# Patient Record
Sex: MALE | Race: WHITE | NOT HISPANIC OR LATINO | Employment: OTHER | URBAN - NONMETROPOLITAN AREA
[De-identification: names, ages, dates, MRNs, and addresses within clinical notes are randomized per-mention and may not be internally consistent; named-entity substitution may affect disease eponyms.]

---

## 2019-04-01 ENCOUNTER — TRANSFERRED RECORDS (OUTPATIENT)
Dept: HEALTH INFORMATION MANAGEMENT | Facility: OTHER | Age: 74
End: 2019-04-01

## 2019-05-22 ENCOUNTER — TRANSFERRED RECORDS (OUTPATIENT)
Dept: HEALTH INFORMATION MANAGEMENT | Facility: OTHER | Age: 74
End: 2019-05-22

## 2019-11-01 RX ORDER — QUINAPRIL 10 MG/1
10 TABLET ORAL AT BEDTIME
COMMUNITY

## 2019-11-01 RX ORDER — MONTELUKAST SODIUM 10 MG/1
10 TABLET ORAL AT BEDTIME
COMMUNITY

## 2019-11-01 RX ORDER — OXYCODONE AND ACETAMINOPHEN 7.5; 325 MG/1; MG/1
1 TABLET ORAL EVERY 6 HOURS PRN
COMMUNITY
End: 2019-11-04

## 2019-11-01 RX ORDER — FLUOCINONIDE 0.5 MG/G
CREAM TOPICAL 2 TIMES DAILY
COMMUNITY

## 2019-11-01 RX ORDER — KETOCONAZOLE 20 MG/G
CREAM TOPICAL DAILY
COMMUNITY

## 2019-11-01 RX ORDER — SILDENAFIL 25 MG/1
25 TABLET, FILM COATED ORAL DAILY PRN
COMMUNITY
End: 2019-11-04

## 2019-11-01 RX ORDER — LEVOTHYROXINE SODIUM 25 UG/1
25 TABLET ORAL DAILY
COMMUNITY

## 2019-11-01 RX ORDER — ALENDRONATE SODIUM 70 MG/1
70 TABLET ORAL
COMMUNITY

## 2019-11-04 ENCOUNTER — OFFICE VISIT (OUTPATIENT)
Dept: UROLOGY | Facility: OTHER | Age: 74
End: 2019-11-04
Attending: UROLOGY
Payer: MEDICARE

## 2019-11-04 VITALS — HEART RATE: 84 BPM | SYSTOLIC BLOOD PRESSURE: 140 MMHG | DIASTOLIC BLOOD PRESSURE: 88 MMHG

## 2019-11-04 DIAGNOSIS — N52.9 ED (ERECTILE DYSFUNCTION) OF ORGANIC ORIGIN: ICD-10-CM

## 2019-11-04 DIAGNOSIS — R35.0 BENIGN PROSTATIC HYPERPLASIA WITH URINARY FREQUENCY: Primary | ICD-10-CM

## 2019-11-04 DIAGNOSIS — N40.1 BENIGN PROSTATIC HYPERPLASIA WITH URINARY FREQUENCY: Primary | ICD-10-CM

## 2019-11-04 PROCEDURE — 99204 OFFICE O/P NEW MOD 45 MIN: CPT | Performed by: UROLOGY

## 2019-11-04 PROCEDURE — G0463 HOSPITAL OUTPT CLINIC VISIT: HCPCS

## 2019-11-04 RX ORDER — TAMSULOSIN HYDROCHLORIDE 0.4 MG/1
0.4 CAPSULE ORAL EVERY EVENING
Qty: 90 CAPSULE | Refills: 3 | Status: SHIPPED | OUTPATIENT
Start: 2019-11-04 | End: 2020-05-27

## 2019-11-04 ASSESSMENT — PAIN SCALES - GENERAL: PAINLEVEL: NO PAIN (0)

## 2019-11-04 NOTE — NURSING NOTE
Pt presents to clinic for urinary issues foreskin issues    Review of Systems:    Weight loss:    No     Recent fever/chills:  Yes   Night sweats:   No  Current skin rash:  Yes   Recent hair loss:  No  Heat intolerance:  No   Cold intolerance:  No  Chest pain:   No   Palpitations:   No  Shortness of breath:  No   Wheezing:   Yes  Constipation:    No   Diarrhea:   No   Nausea:   No   Vomiting:   No   Kidney/side pain:  No   Back pain:   No  Frequent headaches:  No   Dizziness:     Yes  Leg swelling:   Yes   Calf pain:    Yes    Parents, brothers or sisters with history of kidney cancer:   No  Parents, brothers or sisters with history of bladder cancer: No  Father or brother with history of prostate cancer:  No

## 2019-11-04 NOTE — PROGRESS NOTES
Type of Visit  NPV    Chief Complaint  ED  Urinary frequency    HPI  Mr. Sanchez is a 74 year old male who presents with ED and urinary frequency.  The patient complains of ED that has progressively worsened over the last 5 \years.  Primary issue is maintenance of an erection.  He is able to achieve a fairly satisfactory 8 out of 10 erection currently.  He does not use medication and has not used medication in the past.  He also has not used a constriction band.    Regarding urinary symptoms he describes worsening urgency with urinary frequency over the last 3 to 5 years.  He denies gross hematuria and dysuria.  He has never used medication.  The clinical bother is moderate.      Past Medical History  He  has a past medical history of Atrial fibrillation (H), Hypertension, Hypothyroidism, Knee arthropathy, Pacemaker, and Vasculopathy.  There is no problem list on file for this patient.      Past Surgical History  He  has no past surgical history on file.    Medications  He has a current medication list which includes the following prescription(s): tamsulosin, alendronate, apixaban anticoagulant, dronedarone, fluocinonide, ketoconazole, levothyroxine, montelukast, and quinapril.    Allergies  No Known Allergies    Social History  He  reports that he has never smoked. He has never used smokeless tobacco. He reports current alcohol use. He reports that he does not use drugs.  No drug abuse.    Family History  History reviewed. No pertinent family history.    Review of Systems  I personally reviewed the ROS with the patient.    Nursing Notes:   Cassandra Florez LPN  11/4/2019 11:21 AM  Sign at exiting of workspace  Pt presents to clinic for urinary issues foreskin issues    Review of Systems:    Weight loss:    No     Recent fever/chills:  Yes   Night sweats:   No  Current skin rash:  Yes   Recent hair loss:  No  Heat intolerance:  No   Cold intolerance:  No  Chest pain:   No   Palpitations:   No  Shortness of  breath:  No   Wheezing:   Yes  Constipation:    No   Diarrhea:   No   Nausea:   No   Vomiting:   No   Kidney/side pain:  No   Back pain:   No  Frequent headaches:  No   Dizziness:     Yes  Leg swelling:   Yes   Calf pain:    Yes    Parents, brothers or sisters with history of kidney cancer:   No  Parents, brothers or sisters with history of bladder cancer: No  Father or brother with history of prostate cancer:  No      Physical Exam  Vitals:    11/04/19 1124   BP: (!) 140/88   BP Location: Right arm   Patient Position: Sitting   Cuff Size: Adult Large   Pulse: 84     Constitutional: No acute distress.  Alert and cooperative   Head: NCAT  Eyes: Conjunctivae normal  Cardiovascular: Regular rate.  Pulmonary/Chest: Respirations are even and non-labored bilaterally, no audible wheezing  Abdominal: Soft. No distension, tenderness, masses or guarding.   Neurological: A + O x 3.  Cranial Nerves II-XII grossly intact.  Extremities: DARIEN x 4, Warm. No clubbing.  No cyanosis.    Skin: Pink, warm and dry.  No visible rashes noted.  Psychiatric:  Normal mood and affect  Back:  No left CVA tenderness.  No right CVA tenderness.  Genitourinary:  Nonpalpable bladder  Uncircumcised and foreskin is easily retractable    Assessment  Mr. Sanchez is a 74 year old male who presents with urinary frequency and ED.  We discussed various treatment options for his urinary complaints.  I explained that he likely has an overactive bladder given his symptoms.  On exam today his foreskin was easily retractable and thus not a physical limitation to voiding.  Because of this I did not recommend circumcision.  We discussed anticholinergics and the potential limiting side effects.  I recommended a trial of Flomax first given it is much more tolerable.    We discussed side effects of Flomax including, but not limited to, retrograde ejaculation, congestion and lightheadedness.    Regarding erectile dysfunction his primary issue is maintenance of  erection.  We discussed medication versus constriction band.  I recommended starting with a constriction band    Plan  Start Flomax 0.4mg every evening  Instruction band for ED  Follow up in 2 weeks with a PVR

## 2019-11-06 ENCOUNTER — TELEPHONE (OUTPATIENT)
Dept: UROLOGY | Facility: OTHER | Age: 74
End: 2019-11-06

## 2019-11-06 NOTE — TELEPHONE ENCOUNTER
Notified patient's spouse that I cannot discuss information with her and that I need to speak with Crow.  She said she was not in the same place as Crow and the connection was fuzzy and then was lost.  Left message for Crow to call back at the number [phone] on file.  Per Chandrakant Wise MD wheezing is not an contraindication to starting flomax and patient may start the medication.  Swapna Oleary RN......November 6, 2019...3:16 PM

## 2019-11-06 NOTE — TELEPHONE ENCOUNTER
Patient's spouse, Abril, states patient has had wheezing in the past and would like to know if the Rx prescribed (Tamsulosin) will cause any issues, as the instructions that came with Rx indicated to inform Provider prior to taking, if one had wheezing issues. Please call.

## 2019-11-18 ENCOUNTER — OFFICE VISIT (OUTPATIENT)
Dept: UROLOGY | Facility: OTHER | Age: 74
End: 2019-11-18
Attending: UROLOGY
Payer: MEDICARE

## 2019-11-18 VITALS — DIASTOLIC BLOOD PRESSURE: 82 MMHG | SYSTOLIC BLOOD PRESSURE: 132 MMHG | HEART RATE: 88 BPM | RESPIRATION RATE: 20 BRPM

## 2019-11-18 DIAGNOSIS — N40.1 BENIGN PROSTATIC HYPERPLASIA WITH URINARY FREQUENCY: ICD-10-CM

## 2019-11-18 DIAGNOSIS — R35.0 BENIGN PROSTATIC HYPERPLASIA WITH URINARY FREQUENCY: ICD-10-CM

## 2019-11-18 DIAGNOSIS — N52.9 ED (ERECTILE DYSFUNCTION) OF ORGANIC ORIGIN: Primary | ICD-10-CM

## 2019-11-18 PROCEDURE — 51798 US URINE CAPACITY MEASURE: CPT | Performed by: UROLOGY

## 2019-11-18 PROCEDURE — G0463 HOSPITAL OUTPT CLINIC VISIT: HCPCS | Mod: 25

## 2019-11-18 PROCEDURE — 99214 OFFICE O/P EST MOD 30 MIN: CPT | Performed by: UROLOGY

## 2019-11-18 ASSESSMENT — PAIN SCALES - GENERAL: PAINLEVEL: NO PAIN (0)

## 2019-11-18 NOTE — NURSING NOTE
Pt presents to clinic for flomax medication follow up    Review of Systems:    Weight loss:    No     Recent fever/chills:  No   Night sweats:   No  Current skin rash:  No   Recent hair loss:  No  Heat intolerance:  No   Cold intolerance:  No  Chest pain:   No   Palpitations:   No  Shortness of breath:  No   Wheezing:   Yes  Constipation:    No   Diarrhea:   No   Nausea:   No   Vomiting:   No   Kidney/side pain:  No   Back pain:   No  Frequent headaches:  No   Dizziness:     No  Leg swelling:   No   Calf pain:    No    Post-Void Residual  A post-void residual was measured by ultrasonic bladder scanner.  104 mL

## 2019-11-18 NOTE — PROGRESS NOTES
Type of Visit  EST    Chief Complaint  ED  Urinary frequency    HPI  Mr. Sanchez is a 74 year old male who follows up with ED and urinary frequency.  The patient complains of ED that has progressively worsened over the last 5 years.  Primary issue is maintenance of an erection.  He is able to achieve a fairly satisfactory 8 out of 10 erection currently.  He does not use medication and has not used medication in the past.  He also has not used a constriction band.  I recommended use of a constriction band at the last visit 2 weeks ago.  He has not purchased a constriction band at this time.  He does have some questions regarding how to acquire it.    Regarding urinary symptoms he describes worsening urgency with urinary frequency over the last 3 to 5 years.  He denies gross hematuria and dysuria.  At the last visit 2 weeks ago we started Flomax 0.4 mg once daily.  Denies side effects such as nasal congestion or lightheadedness.  He feels like the medication has improved his ability to void, strength in his stream.  He also reports less incontinence.      Social History  He  reports that he has never smoked. He has never used smokeless tobacco. He reports current alcohol use. He reports that he does not use drugs.  No drug abuse.    Family History  History reviewed. No pertinent family history.    Review of Systems  I personally reviewed the ROS with the patient.    Nursing Notes:   Cassandra Florez LPN  11/18/2019  1:44 PM  Signed  Pt presents to clinic for flomax medication follow up    Review of Systems:    Weight loss:    No     Recent fever/chills:  No   Night sweats:   No  Current skin rash:  No   Recent hair loss:  No  Heat intolerance:  No   Cold intolerance:  No  Chest pain:   No   Palpitations:   No  Shortness of breath:  No   Wheezing:   Yes  Constipation:    No   Diarrhea:   No   Nausea:   No   Vomiting:   No   Kidney/side pain:  No   Back pain:   No  Frequent headaches:  No   Dizziness:     No  Leg  swelling:   No   Calf pain:    No    Post-Void Residual  A post-void residual was measured by ultrasonic bladder scanner.  104 mL          Physical Exam  Vitals:    11/18/19 1333   BP: 132/82   BP Location: Right arm   Patient Position: Sitting   Cuff Size: Adult Large   Pulse: 88   Resp: 20   Constitutional: No acute distress.  Alert and cooperative   Head: NCAT  Eyes: Conjunctivae normal  Cardiovascular: Regular rate.  Pulmonary/Chest: Respirations are even and non-labored bilaterally, no audible wheezing  Abdominal: Soft. No distension, tenderness, masses or guarding.   Neurological: A + O x 3.  Cranial Nerves II-XII grossly intact.  Extremities: DARIEN x 4, Warm. No clubbing.  No cyanosis.    Skin: Pink, warm and dry.  No visible rashes noted.  Psychiatric:  Normal mood and affect  Back:  No left CVA tenderness.  No right CVA tenderness.  Genitourinary:  Nonpalpable bladder  Uncircumcised and foreskin is easily retractable    Post-Void Residual  A post-void residual was measured by ultrasonic bladder scanner.  104 mL today    Assessment  Mr. Sanchez is a 74 year old male who follows up with urinary frequency and ED.    His urinary symptoms were improved with Flomax so we will continue this medication.    Regarding erectile dysfunction his primary issue is maintenance of erection.  We again discussed medication versus constriction band.  I recommended starting with a constriction band and demonstrated where to acquire these on the internet.    Plan  Continue Flomax 0.4mg every evening  Constriction band for ED   -Again discussed this approach for ED  Follow up in 1 year with a PVR

## 2020-04-30 ENCOUNTER — TELEPHONE (OUTPATIENT)
Dept: UROLOGY | Facility: OTHER | Age: 75
End: 2020-04-30

## 2020-04-30 NOTE — TELEPHONE ENCOUNTER
Patient called and they are wondering if itching is a side affect from flomax. Patient has itching in groin, on his back and arms. Has been to two dermatologists. Please contact patient.     Christina Ruffin on 4/30/2020 at 2:26 PM

## 2020-04-30 NOTE — TELEPHONE ENCOUNTER
Pt states that he has had a rash for 2 months, was wondering if any other patients have had the same issue.  Has been to 4 dermatologist, waiting for some results. Recommended that he speak to the pharmacists

## 2020-05-27 DIAGNOSIS — R35.0 BENIGN PROSTATIC HYPERPLASIA WITH URINARY FREQUENCY: ICD-10-CM

## 2020-05-27 DIAGNOSIS — N40.1 BENIGN PROSTATIC HYPERPLASIA WITH URINARY FREQUENCY: ICD-10-CM

## 2020-05-27 RX ORDER — TAMSULOSIN HYDROCHLORIDE 0.4 MG/1
0.4 CAPSULE ORAL EVERY EVENING
Qty: 90 CAPSULE | Refills: 1 | Status: SHIPPED | OUTPATIENT
Start: 2020-05-27 | End: 2021-01-26

## 2020-05-27 NOTE — TELEPHONE ENCOUNTER
[facility] sent Rx request for the following:    Tamsulosin 0.4mg Capsules   Take 1 capsule 0.4mg by mouth every evening.     Last Prescription Date:   11/4/2019  Last Fill Qty/Refills:         90, R-3    Last Office Visit:              11/18/2019  Future Office visit:           None     Routing refill request to provider for review/approval because:  Pts [place] of [place] and a new Rx needs to be sent.     Unable to complete prescription refill per RN Medication Refill Policy.................... Mulu Peng RN ....................  5/27/2020   11:49 AM

## 2021-01-25 DIAGNOSIS — R35.0 BENIGN PROSTATIC HYPERPLASIA WITH URINARY FREQUENCY: ICD-10-CM

## 2021-01-25 DIAGNOSIS — N40.1 BENIGN PROSTATIC HYPERPLASIA WITH URINARY FREQUENCY: ICD-10-CM

## 2021-01-25 NOTE — TELEPHONE ENCOUNTER
"Pt.'s last office visit with Chandrakant Wise MD was on 11/18/2019 and note states:  \"Plan  Continue Flomax 0.4mg every evening  Constriction band for ED              -Again discussed this approach for ED  Follow up in 1 year with a PVR\"    Left message to call back to clarify where patient is living and notify him that he is overdue for a follow up.  Swapna Oleary RN.........1/25/2021...8:37 AM   "

## 2021-01-26 ENCOUNTER — TELEPHONE (OUTPATIENT)
Dept: UROLOGY | Facility: OTHER | Age: 76
End: 2021-01-26

## 2021-01-26 NOTE — TELEPHONE ENCOUNTER
Patient states that he is currently living in [place] but also lives in [place] part of the time.  He was unable to come back to [place] due to Covid.  He plans to come back in July though.  Will discuss with Chandrakant Wise MD to see how he would like to proceed.  Patient is in the works of getting a new PCP in [place].  Swapna Oleary RN......January 26, 2021...11:00 AM

## 2021-01-26 NOTE — TELEPHONE ENCOUNTER
Nida from [place], [place] called regarding status of Tamsulosin. She verified his last name and . She was informed that a message had been left for Pt to clarify where he is living and notify him that he is overdue for follow up. Nida verbalized plan to notify Pt of this, if he calls their pharmacy. No further action taken at this time. Daly Lomeli RN .............. 2021  9:18 AM

## 2021-01-26 NOTE — TELEPHONE ENCOUNTER
Per Chandrakant Wise MD he will refill tamsulosin for 6 months and then patient needs to be seen in clinic prior to further refills.  Will notify patient after script is sent.  Swapna Oleary RN......January 26, 2021...2:50 PM

## 2021-01-27 RX ORDER — TAMSULOSIN HYDROCHLORIDE 0.4 MG/1
0.4 CAPSULE ORAL EVERY EVENING
Qty: 90 CAPSULE | Refills: 1 | Status: SHIPPED | OUTPATIENT
Start: 2021-01-27

## 2021-01-28 NOTE — TELEPHONE ENCOUNTER
Patient unavailable per wife.  She will have him call me back if he has any questions.  Swapna Oleary RN......January 28, 2021...1:15 PM

## 2021-08-09 DIAGNOSIS — R35.0 BENIGN PROSTATIC HYPERPLASIA WITH URINARY FREQUENCY: ICD-10-CM

## 2021-08-09 DIAGNOSIS — N40.1 BENIGN PROSTATIC HYPERPLASIA WITH URINARY FREQUENCY: ICD-10-CM

## 2021-08-09 RX ORDER — TAMSULOSIN HYDROCHLORIDE 0.4 MG/1
CAPSULE ORAL
Qty: 90 CAPSULE | Refills: 1 | OUTPATIENT
Start: 2021-08-09

## 2021-08-10 NOTE — TELEPHONE ENCOUNTER
Patient is in [place] and will have his provider there sent in a prescription for him.     Christina Ruffin on 8/10/2021 at 10:02 AM